# Patient Record
Sex: FEMALE | URBAN - METROPOLITAN AREA
[De-identification: names, ages, dates, MRNs, and addresses within clinical notes are randomized per-mention and may not be internally consistent; named-entity substitution may affect disease eponyms.]

---

## 2021-07-15 ENCOUNTER — APPOINTMENT (RX ONLY)
Dept: URBAN - METROPOLITAN AREA CLINIC 94 | Facility: CLINIC | Age: 66
Setting detail: DERMATOLOGY
End: 2021-07-15

## 2021-07-15 VITALS — TEMPERATURE: 96.8 F

## 2021-07-15 DIAGNOSIS — E85.4 ORGAN-LIMITED AMYLOIDOSIS: ICD-10-CM

## 2021-07-15 PROCEDURE — ? TREATMENT REGIMEN

## 2021-07-15 PROCEDURE — 99202 OFFICE O/P NEW SF 15 MIN: CPT

## 2021-07-15 PROCEDURE — ? COUNSELING

## 2021-07-15 PROCEDURE — ? PRESCRIPTION

## 2021-07-15 RX ORDER — TRIAMCINOLONE ACETONIDE 1 MG/G
CREAM TOPICAL
Qty: 1 | Refills: 1 | Status: ERX | COMMUNITY
Start: 2021-07-15

## 2021-07-15 RX ADMIN — TRIAMCINOLONE ACETONIDE: 1 CREAM TOPICAL at 00:00

## 2021-07-15 ASSESSMENT — LOCATION DETAILED DESCRIPTION DERM: LOCATION DETAILED: SUPERIOR THORACIC SPINE

## 2021-07-15 ASSESSMENT — LOCATION ZONE DERM: LOCATION ZONE: TRUNK

## 2021-07-15 ASSESSMENT — LOCATION SIMPLE DESCRIPTION DERM: LOCATION SIMPLE: UPPER BACK

## 2021-07-15 NOTE — PROCEDURE: TREATMENT REGIMEN
Plan: Patient to apply Triamcinolone 0.1% cream to affected areas on back BID PRN, use cold towels to decrease itchiness or burning and avoid scratching (discussed nature of perpetuation of itch-scratch-itch cycle). I also discussed possibility of nostalgia paresthetica which may warrant imaging to look for spinal nerve impingement if patient is not improving with above treatment regimen
Detail Level: Simple